# Patient Record
Sex: MALE | Race: WHITE | NOT HISPANIC OR LATINO | Employment: OTHER | URBAN - METROPOLITAN AREA
[De-identification: names, ages, dates, MRNs, and addresses within clinical notes are randomized per-mention and may not be internally consistent; named-entity substitution may affect disease eponyms.]

---

## 2017-01-18 ENCOUNTER — GENERIC CONVERSION - ENCOUNTER (OUTPATIENT)
Dept: OTHER | Facility: OTHER | Age: 82
End: 2017-01-18

## 2017-01-24 ENCOUNTER — GENERIC CONVERSION - ENCOUNTER (OUTPATIENT)
Dept: OTHER | Facility: OTHER | Age: 82
End: 2017-01-24

## 2017-02-06 ENCOUNTER — GENERIC CONVERSION - ENCOUNTER (OUTPATIENT)
Dept: OTHER | Facility: OTHER | Age: 82
End: 2017-02-06

## 2017-02-15 ENCOUNTER — GENERIC CONVERSION - ENCOUNTER (OUTPATIENT)
Dept: OTHER | Facility: OTHER | Age: 82
End: 2017-02-15

## 2017-02-22 ENCOUNTER — GENERIC CONVERSION - ENCOUNTER (OUTPATIENT)
Dept: OTHER | Facility: OTHER | Age: 82
End: 2017-02-22

## 2017-02-23 ENCOUNTER — GENERIC CONVERSION - ENCOUNTER (OUTPATIENT)
Dept: OTHER | Facility: OTHER | Age: 82
End: 2017-02-23

## 2017-02-24 ENCOUNTER — GENERIC CONVERSION - ENCOUNTER (OUTPATIENT)
Dept: OTHER | Facility: OTHER | Age: 82
End: 2017-02-24

## 2017-02-26 ENCOUNTER — GENERIC CONVERSION - ENCOUNTER (OUTPATIENT)
Dept: OTHER | Facility: OTHER | Age: 82
End: 2017-02-26

## 2018-01-12 NOTE — MISCELLANEOUS
Message   Recorded as Task   Date: 12/05/2016 11:30 AM, Created By: Daniele Fox   Task Name: Care Coordination   Assigned To: Soledad Wright   Regarding Patient: Shanon Zepeda, Status: In Progress   Comment:    Jorge Luis Carmona - 05 Dec 2016 11:30 AM     TASK CREATED  looks like needs felecia   discharged from [facility] - not sure of date   Soledad Wright - 06 Dec 2016 8:51 AM     TASK EDITED  PT confused-- will attempt to call daughter- Emir Gonzalez - 06 Dec 2016 8:59 AM     TASK EDITED  Msg left for Sparkle Pierre to [place]  - 06 Dec 2016 9:51 AM     TASK EDITED  Daughter says he had a cardioversion 12/2/16 but did not stay overnight  Active Problems    1  Aftercare following surgery of the musculoskeletal system (V58 78) (Z47 89)   2  Allergic rhinitis (477 9) (J30 9)   3  Arthritis (716 90) (M19 90)   4  Asthma (493 90) (J45 909)   5  Cellulitis of ankle (682 6) (L03 119)   6  Colitis (558 9) (K52 9)   7  Congestive heart failure (428 0) (I50 9)   8  Controlled diabetes mellitus type II without complication (018 30) (F98 0)   9  COPD with exacerbation (491 21) (J44 1)   10  Edema (782 3) (R60 9)   11  Esophageal reflux (530 81) (K21 9)   12  Heart disease (429 9) (I51 9)   13  Hyperlipidemia, mixed (272 2) (E78 2)   14  Hypertension (401 9) (I10)   15  Inguinal hernia (550 90) (K40 90)   16  Intertrochanteric fracture of left hip (820 21) (S72 142A)   17  Laceration of eyebrow (873 42) (S01 119A)   18  Localized primary carpometacarpal osteoarthritis (715 14) (M19 049)   19  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)   20  Magnesium deficiency (275 2) (E61 2)   21  Need for pneumococcal vaccination (V03 82) (Z23)   22  Osteoporosis (733 00) (M81 0)   23  Pain in joint of right shoulder region (719 41) (M25 511)   24  Pain in joint, ankle and foot, left (719 47) (M25 572)   25  Primary localized osteoarthritis of left knee (715 16) (M17 12)   26   Right Rotator Cuff Sprain (Capsule) (840 4)   27  Trigger index finger of left hand (727 03) (M65 322)   28  Trigger ring finger of left hand (727 03) (M65 342)   29  Trigger thumb of left hand (727 03) (M65 312)   30  Trigger thumb of right hand (727 03) (M65 311)   31  Wrist sprain (842 00) (S63 509A)    Current Meds   1  Albuterol Sulfate (2 5 MG/3ML) 0 083% Inhalation Nebulization Solution; INHALE 1   0 083% Twice daily; Therapy: 93Qkw9344 to Recorded   2  Amiodarone HCl - 200 MG Oral Tablet; TAKE 1 TABLET DAILY; Therapy: 90Hvd4889 to (Evaluate:47Igm0957) Recorded   3  Aspir-81 81 MG Oral Tablet Delayed Release; TAKE 1 TABLET DAILY; Therapy: 10NEM1016 to Recorded   4  Atrovent HFA 17 MCG/ACT Inhalation Aerosol Solution; INHALE 2 PUFFS TWICE DAILY; Therapy: 60Fvh0987 to Recorded   5  Cephalexin 500 MG Oral Capsule; TAKE 1 CAPSULE 3 TIMES DAILY UNTIL GONE;   Therapy: 67QSE8176 to (Evaluate:62Asu3492)  Requested for: 36LSV2585; Last   Rx:67Kjf8811 Ordered   6  Cephalexin 500 MG Oral Capsule; TAKE 1 CAPSULE 3 TIMES DAILY UNTIL GONE;   Therapy: 20GCB4247 to (Evaluate:13Oct2016)  Requested for: 60VJR1948; Last   Rx:03Oct2016 Ordered   7  Ferrous Sulfate 325 (65 Fe) MG Oral Tablet Delayed Release; TAKE 1 TABLET DAILY; Therapy: 84KWF1978 to Recorded   8  Fexofenadine HCl - 180 MG Oral Tablet; TAKE 1 TABLET DAILY AS NEEDED; Therapy: (Recorded:11Jan2016) to Recorded   9  Fluticasone Propionate 50 MCG/ACT Nasal Suspension; INHALE TWO (2) SPRAY(S)   INTO EACH NOSTRIL  DAILY AFTER BATHING; Therapy: 41Wsn7997 to (Evaluate:50Gpk1845)  Requested for: 84DMS4949; Last   Rx:30Mar2016 Ordered   10  Glimepiride 1 MG Oral Tablet; TAKE 1 TABLET DAILY; Therapy: 46OUU2576 to Recorded   11  Lasix 80 MG Oral Tablet (Furosemide); TAKE 1 TABLET TWICE DAILY; Therapy: (Rani Jeffries) to Recorded   12  Lotemax 0 5 % Ophthalmic Suspension; INSTILL 1 DROP Daily; Therapy: 74FSC7839 to Recorded   13   Maxitrol 3 5-70489-7 1 Ophthalmic Ointment (Neomycin-Polymyxin-Dexameth); APPLY    1/2 INCH RIBBON INTO AFFECTED EYE(S) AT    BEDTIME; Therapy: 10KSJ5768 to Recorded   14  MetOLazone 2 5 MG Oral Tablet; TAKE 1 TABLET DAILY AS DIRECTED; Therapy: 85Tfu6356 to Recorded   15  Metoprolol Succinate ER 25 MG Oral Tablet Extended Release 24 Hour; TAKE 1 TABLET    DAILY; Therapy: 66BTG2159 to (Evaluate:26Oct2016)  Requested for: 30Apr2016; Last    Rx:29Apr2016 Ordered   16  Montelukast Sodium 10 MG Oral Tablet; TAKE 1 TABLET DAILY AS DIRECTED; Therapy: 48MVH6189 to (Earl Booth)  Requested for: 67OQP2120; Last    Rx:02Nov2016; Status: ACTIVE - Retrospective By Protocol Authorization Ordered   17  Proventil  (90 Base) MCG/ACT Inhalation Aerosol Solution; INHALE 1 PUFF    EVERY 4 TO 6 HOURS; Therapy: 31EWY7214 to (Evaluate:01Mar2017)  Requested for: 02Sep2016; Last    Rx:06Nmz8340; Status: ACTIVE - Retrospective By Protocol Authorization Ordered   18  RaNITidine HCl - 150 MG Oral Tablet; TAKE 1 TABLET AT BEDTIME  Requested for:    30Apr2016; Last Rx:29Apr2016 Ordered   19  Restasis 0 05 % Ophthalmic Emulsion; INSTILL 1 DROP IN BOTH EYES EVERY 12    HOURS DAILY; Therapy: 29Apr2016 to Recorded   20  SulfaSALAzine 500 MG Oral Tablet Delayed Release; Take 1 tablet twice daily; Therapy: 11QOC5303 to (Evaluate:12Mar2016)  Requested for: 46Owr6636; Last    Rx:15Sep2015 Ordered   21  Systane 0 4-0 3 % Ophthalmic Solution; INSTILL 1 DROP IN AFFECTED EYE(S) EVERY    4-6 HOURS; Therapy: 90UCC7409 to Recorded   22  Vitamin D 1000 UNIT Oral Tablet; TAKE 1 TABLET DAILY; Therapy: 23FNX3784 to Recorded   23  Vytorin 10-20 MG Oral Tablet; TAKE 1 TABLET DAILY AS DIRECTED; Therapy: 60Xae8131 to (Evaluate:01Mar2017)  Requested for: 64Six0274; Last    Rx:18Prc7722; Status: ACTIVE - Retrospective By Protocol Authorization Ordered   24  Warfarin Sodium 5 MG Oral Tablet;     Therapy: 43XHZ5538 to (Last Rx:03Mar2011)  Requested for: 89AQZ1934 Ordered   25  Warfarin Sodium TABS; 1 1 mg tab po qd for 4 days    1 2 5 mg tab po qd for 4 days; Therapy: (Recorded:11Jan2016) to Recorded    Allergies    1  Iodine SOLN   2  Naprosyn TABS   3   Penicillins    Signatures   Electronically signed by : Neno Walsh, ; Dec  6 2016  9:51AM EST                       (Author)

## 2018-01-15 NOTE — MISCELLANEOUS
Assessment    1  Cellulitis of ankle (682 6) (L03 119)   2  Inguinal hernia (550 90) (K40 90)    Plan  Cellulitis of ankle    · Cephalexin 500 MG Oral Capsule; TAKE 1 CAPSULE 3 TIMES DAILY UNTIL GONE   Rx By: Xavi Avery; Dispense: 10 Days ; #:30 Capsule; Refill: 0; For: Cellulitis of ankle; BEST = N; Verified Transmission to [facility] [id number] [facility]; Last Updated By: System, [employer]; 10/3/2016 10:50:00 AM    Discussion/Summary  Discussion Summary:   Disc lasix use and parameters on lasix by wt/disc cellulitis and redressed wound  Counseling Documentation With Imm: total time of encounter was 30 minutes  Chief Complaint  Chief Complaint Free Text Note Form: FOLLOW-UP HOSPITAL--DISCUSS LASIX DOSAGE      History of Present Illness  TCM Communication [facility]: The patient is being contacted for follow-up after hospitalization  He was hospitalized at [facility]  The date of admission: 09/21/2016, date of discharge: 09/24/2016  Diagnosis: CHF  He was discharged to home  Medications reviewed and updated today  He scheduled a follow up appointment  Follow-up appointments with other specialists: CARDIOLOGIST  The patient is currently asymptomatic  Communication performed and completed by      Review of Systems  Complete-Male:   Constitutional: No fever or chills, feels well, no tiredness, no recent weight gain or weight loss  Cardiovascular: No complaints of slow heart rate, no fast heart rate, no chest pain, no palpitations, no leg claudication, no lower extremity  Respiratory: No complaints of shortness of breath, no wheezing, no cough, no SOB on exertion, no orthopnea or PND  Gastrointestinal: No complaints of abdominal pain, no constipation, no nausea or vomiting, no diarrhea or bloody stools  Active Problems    1  Aftercare following surgery of the musculoskeletal system (V58 78) (Z47 89)   2  Allergic rhinitis (477 9) (J30 9)   3  Arthritis (716 90) (M19 90)   4   Asthma (493 90) (J45 909)   5  Cellulitis of ankle (682 6) (L03 119)   6  Colitis (558 9) (K52 9)   7  Congestive heart failure (428 0) (I50 9)   8  Controlled diabetes mellitus type II without complication (025 82) (F49 2)   9  COPD with exacerbation (491 21) (J44 1)   10  Edema (782 3) (R60 9)   11  Esophageal reflux (530 81) (K21 9)   12  Heart disease (429 9) (I51 9)   13  Hyperlipidemia, mixed (272 2) (E78 2)   14  Hypertension (401 9) (I10)   15  Inguinal hernia (550 90) (K40 90)   16  Intertrochanteric fracture of left hip (820 21) (S72 142A)   17  Laceration of eyebrow (873 42) (S01 119A)   18  Localized primary carpometacarpal osteoarthritis (715 14) (M19 049)   19  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)   20  Magnesium deficiency (275 2) (E61 2)   21  Need for pneumococcal vaccination (V03 82) (Z23)   22  Osteoporosis (733 00) (M81 0)   23  Pain in joint of right shoulder region (719 41) (M25 511)   24  Pain in joint, ankle and foot, left (719 47) (M25 572)   25  Primary localized osteoarthritis of left knee (715 16) (M17 12)   26  Right Rotator Cuff Sprain (Capsule) (840 4)   27  Trigger index finger of left hand (727 03) (M65 322)   28  Trigger ring finger of left hand (727 03) (M65 342)   29  Trigger thumb of left hand (727 03) (M65 312)   30  Trigger thumb of right hand (727 03) (M65 311)   31  Wrist sprain (842 00) (S13 597N)    Past Medical History    1  History of cardiac pacemaker (V12 50,V45 01) (Z95 0)   2  History of macular degeneration (V12 49) (Z86 69)   3  History of polymyalgia rheumatica (V13 59) (Z87 39)   4  History of Trigger thumb of left hand (727 03) (M65 312)    Surgical History    1  History of Heart Valve Replacement   2  History of Pacemaker Placement    Family History  Mother    1  Maternal family history unobtainable (V49 89) (Z78 9)   2  Paternal family history unobtainable (V49 89) (Z78 9)  Father    3  Maternal family history unobtainable (V49 89) (Z78 9)   4  Paternal family history unobtainable (V49 89) (Z78 9)  Family History    5  Family history of Gastric Cancer (V16 0)    Social History    · Being A Social Drinker   · Daily Cola Consumption (5  Cans/Day)   · Marital History - Currently    · Never A Smoker    Current Meds   1  Albuterol Sulfate (2 5 MG/3ML) 0 083% Inhalation Nebulization Solution; INHALE 1   0 083% Twice daily; Therapy: 29Apr2016 to Recorded   2  Amiodarone HCl - 200 MG Oral Tablet; TAKE 1 TABLET DAILY; Therapy: 34Yro3205 to (Evaluate:48Qgt5302) Recorded   3  Aspir-81 81 MG Oral Tablet Delayed Release; TAKE 1 TABLET DAILY; Therapy: 48UNM2269 to Recorded   4  Atrovent HFA 17 MCG/ACT Inhalation Aerosol Solution; INHALE 2 PUFFS TWICE DAILY; Therapy: 09Bog4374 to Recorded   5  Cephalexin 500 MG Oral Capsule; TAKE 1 CAPSULE 3 TIMES DAILY UNTIL GONE;   Therapy: 99ATL0216 to (Evaluate:07Sfn8908)  Requested for: 77XGI5266; Last   Rx:01Jul2016 Ordered   6  Ferrous Sulfate 325 (65 Fe) MG Oral Tablet Delayed Release; TAKE 1 TABLET DAILY; Therapy: 87AMF3829 to Recorded   7  Fexofenadine HCl - 180 MG Oral Tablet; TAKE 1 TABLET DAILY AS NEEDED; Therapy: (Recorded:11Jan2016) to Recorded   8  Fluticasone Propionate 50 MCG/ACT Nasal Suspension; INHALE TWO (2) SPRAY(S)   INTO EACH NOSTRIL  DAILY AFTER BATHING; Therapy: 11Kfy5621 to (Evaluate:89Zqt6304)  Requested for: 98KSB4193; Last   Rx:30Mar2016 Ordered   9  Glimepiride 1 MG Oral Tablet; TAKE 1 TABLET DAILY; Therapy: 33GKB4068 to Recorded   10  Lasix 80 MG Oral Tablet; TAKE 1 TABLET TWICE DAILY; Therapy: (Donaldo Jeffrey) to Recorded   11  Lotemax 0 5 % Ophthalmic Suspension; INSTILL 1 DROP Daily; Therapy: 47TGO8822 to Recorded   12  Maxitrol 3 5-80553-0 1 Ophthalmic Ointment; APPLY 1/2 INCH RIBBON INTO AFFECTED    EYE(S) AT BEDTIME; Therapy: 33NLW6886 to Recorded   13  Metolazone 2 5 MG Oral Tablet; TAKE 1 TABLET DAILY AS DIRECTED; Therapy: 67Tic3559 to Recorded   14  Metoprolol Succinate ER 25 MG Oral Tablet Extended Release 24 Hour; TAKE 1 TABLET    DAILY; Therapy: 86FSN0795 to (Evaluate:26Oct2016)  Requested for: 30Apr2016; Last    Rx:29Apr2016 Ordered   15  Montelukast Sodium 10 MG Oral Tablet; TAKE 1 TABLET DAILY AS DIRECTED     Requested for: 02Sep2016; Last Rx:02Sep2016; Status: ACTIVE - Retrospective By    Protocol Authorization Ordered   16  Proventil  (90 Base) MCG/ACT Inhalation Aerosol Solution; INHALE 1 PUFF    EVERY 4 TO 6 HOURS; Therapy: 69UIF4283 to (Evaluate:01Mar2017)  Requested for: 02Sep2016; Last    Rx:02Sep2016; Status: ACTIVE - Retrospective By Protocol Authorization Ordered   17  Ranitidine HCl - 150 MG Oral Tablet; TAKE 1 TABLET AT BEDTIME  Requested for:    30Apr2016; Last Rx:29Apr2016 Ordered   18  Restasis 0 05 % Ophthalmic Emulsion; INSTILL 1 DROP IN BOTH EYES EVERY 12    HOURS DAILY; Therapy: 29Apr2016 to Recorded   19  SulfaSALAzine 500 MG Oral Tablet Delayed Release; Take 1 tablet twice daily; Therapy: 67XUL3043 to (Evaluate:12Mar2016)  Requested for: 96Beu6703; Last    Rx:15Sep2015 Ordered   20  Systane 0 4-0 3 % Ophthalmic Solution; INSTILL 1 DROP IN AFFECTED EYE(S) EVERY    4-6 HOURS; Therapy: 53FCS5694 to Recorded   21  Vitamin D 1000 UNIT Oral Tablet; TAKE 1 TABLET DAILY; Therapy: 24KBN1286 to Recorded   22  Vytorin 10-20 MG Oral Tablet; TAKE 1 TABLET DAILY AS DIRECTED; Therapy: 31Ssr1149 to (Evaluate:01Mar2017)  Requested for: 02Sep2016; Last    Rx:02Sep2016; Status: ACTIVE - Retrospective By Protocol Authorization Ordered   23  Warfarin Sodium 5 MG Oral Tablet; Therapy: 77BUY7220 to (Last Rx:03Mar2011)  Requested for: 48KWK3563 Ordered   24  Warfarin Sodium TABS; 1 1 mg tab po qd for 4 days    1 2 5 mg tab po qd for 4 days; Therapy: (Recorded:11Jan2016) to Recorded    Allergies    1  Iodine SOLN   2  Naprosyn TABS   3   Penicillins    Vitals  Signs   Recorded: 57LSE6414 33:30VZ   Systolic: 899  Diastolic: 56  Heart Rate: 78  Height: 5 ft 11 in  Weight: 172 lb   BMI Calculated: 23 99  BSA Calculated: 1 97    Physical Exam    Constitutional   General appearance: No acute distress, well appearing and well nourished  Pulmonary   Auscultation of lungs: Clear to auscultation, equal breath sounds bilaterally, no wheezes, no rales, no rhonci  Cardiovascular   Auscultation of heart: Normal rate and rhythm, normal S1 and S2, without murmurs  Skin   Examination of the skin for lesions: Abnormal   cellulitis R tibial area  Health Management  Controlled diabetes mellitus type II without complication   *VB - Eye Exam; every 1 year; Last 83Syd1849; Next Due: 74Ckw6532; Active  *VB - Foot Exam; every 1 year; Last 62YHO9360; Next Due: 71PDL5949;  Active    Signatures   Electronically signed by : Monica Patel MD; Oct  3 2016 10:56AM EST                       (Author)

## 2018-01-16 NOTE — MISCELLANEOUS
Assessment   1  Congestive heart failure (428 0) (I50 9)  2  COPD with exacerbation (491 21) (J44 1)  3  Hypertension (401 9) (I10)  4  Hyperlipidemia, mixed (272 2) (E78 2)  5  Edema (782 3) (R60 9)  6  Controlled diabetes mellitus type II without complication (168 79) (X69 0)    Discussion/Summary  Discussion Summary:   Continue same meds- Continue F/U with [facility]  Will continue to see as needed every 3-6 months  Observe RLQ pain, this may a hernia- Get the lungs and heart maximized before any surgical consult  Chief Complaint  Chief Complaint Free Text Note Form: MERLINE      History of Present Illness  TCM Communication [facility]: The patient is being contacted for follow-up after hospitalization and [facility]  Hospital records were reviewed  He was hospitalized at [facility]  The dates of hospitalization: 6/2/16-6/7/16  Diagnosis: CHF  He was discharged to home  Medications reviewed and updated today  He scheduled a follow up appointment  The patient is currently asymptomatic  Referrals Needed:  N/A  Counseling was provided to patient's caretaker  Communication performed and completed by MADONNA aKtz [facility] @[facility]   HPI: F/U from [facility] admit for CHF- See Dr Sophia Aleman- seen 6/14/16  1) CHF- per Cardiology  2) Diabetes- Dr Belinda Khan 6/8- stopped Januvia  3) COPD- pulmonary- Federico Londono      Review of Systems  Complete-Male:   Constitutional: no fever, not feeling poorly, no chills and not feeling tired  Cardiovascular: the heart rate was not slow, no chest pain, the heart rate was not fast and no palpitations  Respiratory: wheezing and shortness of breath during exertion, but no shortness of breath and no cough  Gastrointestinal: no abdominal pain, no nausea, no vomiting, no constipation and no diarrhea  Genitourinary: nocturia, but no dysuria and no urinary hesitancy  Psychiatric: no personality change, no depression and no emotional problems  Active Problems   1   Aftercare following surgery of the musculoskeletal system (V58 78) (Z47 89)  2  Allergic rhinitis (477 9) (J30 9)  3  Arthritis (716 90) (M19 90)  4  Asthma (493 90) (J45 909)  5  Colitis (558 9) (K52 9)  6  Congestive heart failure (428 0) (I50 9)  7  Controlled diabetes mellitus type II without complication (261 79) (T75 2)  8  COPD with exacerbation (491 21) (J44 1)  9  Edema (782 3) (R60 9)  10  Esophageal reflux (530 81) (K21 9)  11  Heart disease (429 9) (I51 9)  12  Hyperlipidemia, mixed (272 2) (E78 2)  13  Hypertension (401 9) (I10)  14  Intertrochanteric fracture of left hip (820 21) (S72 142A)  15  Laceration of eyebrow (873 42) (S01 119A)  16  Localized primary carpometacarpal osteoarthritis (715 14) (M19 049)  17  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)  18  Magnesium deficiency (275 2) (E61 2)  19  Need for pneumococcal vaccination (V03 82) (Z23)  20  Osteoporosis (733 00) (M81 0)  21  Pain in joint of right shoulder region (719 41) (M25 511)  22  Pain in joint, ankle and foot, left (719 47) (M25 572)  23  Primary localized osteoarthritis of left knee (715 16) (M17 12)  24  Right Rotator Cuff Sprain (Capsule) (840 4)  25  Trigger index finger of left hand (727 03) (M65 322)  26  Trigger ring finger of left hand (727 03) (M65 342)  27  Trigger thumb of left hand (727 03) (M65 312)  28  Trigger thumb of right hand (727 03) (M65 311)  29  Wrist sprain (842 00) (D54 214N)    Past Medical History   1  History of cardiac pacemaker (V12 50,V45 01) (Z95 0)  2  History of macular degeneration (V12 49) (Z86 69)  3  History of polymyalgia rheumatica (V13 59) (Z87 39)  4  History of Trigger thumb of left hand (727 03) (M65 312)    Surgical History   1  History of Heart Valve Replacement  2  History of Pacemaker Placement    Family History  Mother   1  Maternal family history unobtainable (V49 89) (Z78 9)  2  Paternal family history unobtainable (V49 89) (Z78 9)  Father   3   Maternal family history unobtainable (V49 89) (Z78 9)  4  Paternal family history unobtainable (V49 89) (Z78 9)  Family History   5  Family history of Gastric Cancer (V16 0)  Family History Reviewed: The family history was reviewed and updated today  Social History    · Being A Social Drinker   · Daily Cola Consumption (5  Cans/Day)   · Marital History - Currently    · Never A Smoker  Social History Reviewed: The social history was reviewed and updated today  Current Meds  1  Albuterol Sulfate (2 5 MG/3ML) 0 083% Inhalation Nebulization Solution; INHALE 1   0 083% Twice daily; Therapy: 31Rzj5740 to Recorded  2  Amiodarone HCl - 200 MG Oral Tablet; TAKE 1 TABLET DAILY; Therapy: 94Hse2962 to (Evaluate:64Nkv8724) Recorded  3  Aspir-81 81 MG Oral Tablet Delayed Release; TAKE 1 TABLET DAILY; Therapy: 41ZDZ9857 to Recorded  4  Ferrous Sulfate 325 (65 Fe) MG Oral Tablet Delayed Release; TAKE 1 TABLET DAILY; Therapy: 35SCB0656 to Recorded  5  Fexofenadine HCl - 180 MG Oral Tablet; TAKE 1 TABLET DAILY AS NEEDED; Therapy: (Recorded:11Jan2016) to Recorded  6  Fluticasone Propionate 50 MCG/ACT Nasal Suspension; INHALE TWO (2) SPRAY(S)   INTO EACH NOSTRIL  DAILY AFTER BATHING; Therapy: 87Sng3674 to (Evaluate:90Owk3265)  Requested for: 38ICT4718; Last   Rx:30Mar2016 Ordered  7  Glimepiride 1 MG Oral Tablet; TAKE 1 TABLET DAILY; Therapy: 49UDL8941 to Recorded  8  Lasix 40 MG Oral Tablet; TAKE 2 TABLETS IN THE MORNING AND 1 TABLET IN THE   EVENING; Therapy: (Recorded:15Jun2016) to Recorded  9  Lotemax 0 5 % Ophthalmic Suspension; INSTILL 1 DROP Daily; Therapy: 99GVL2777 to Recorded  10  Magnesium 400 MG Oral Capsule; Take 1 capsule twice daily; Therapy: 03HUU9361 to Recorded  11  Maxitrol 3 5-50727-2 1 Ophthalmic Ointment; APPLY 1/2 INCH RIBBON INTO AFFECTED    EYE(S) AT BEDTIME; Therapy: 30LWH6091 to Recorded  12  Metoprolol Succinate ER 25 MG Oral Tablet Extended Release 24 Hour; TAKE 1 TABLET    DAILY;     Therapy: 98UCM7023 to (Evaluate:26Oct2016)  Requested for: 30Apr2016; Last    Rx:29Apr2016 Ordered  13  Montelukast Sodium 10 MG Oral Tablet; TAKE 1 TABLET DAILY AS DIRECTED     Requested for: 30Apr2016; Last Rx:29Apr2016 Ordered  14  Proventil  (90 Base) MCG/ACT Inhalation Aerosol Solution; INHALE 1 PUFF    EVERY 4 TO 6 HOURS; Therapy: 12EDB3034 to (Evaluate:27Aug2016)  Requested for: 85BAB1470; Last    Rx:59Sao6848 Ordered  15  Ranitidine HCl - 150 MG Oral Tablet; TAKE 1 TABLET AT BEDTIME  Requested for:    30Apr2016; Last Rx:29Apr2016 Ordered  16  Restasis 0 05 % Ophthalmic Emulsion; INSTILL 1 DROP IN BOTH EYES EVERY 12    HOURS DAILY; Therapy: 29Apr2016 to Recorded  17  Stiolto Respimat 2 5-2 5 MCG/ACT Inhalation Aerosol Solution; INHALE 2 PUFFS Daily; Therapy: 29Apr2016 to Recorded  18  SulfaSALAzine 500 MG Oral Tablet Delayed Release; Take 1 tablet twice daily; Therapy: 09KMT5402 to (Evaluate:12Mar2016)  Requested for: 42Mwv1188; Last    Rx:15Sep2015 Ordered  19  Systane 0 4-0 3 % Ophthalmic Solution; INSTILL 1 DROP IN AFFECTED EYE(S) EVERY    4-6 HOURS; Therapy: 54RQT1138 to Recorded  20  Vitamin D 1000 UNIT Oral Tablet; TAKE 1 TABLET DAILY; Therapy: 77NDM0078 to Recorded  21  Vytorin 10-20 MG Oral Tablet; TAKE 1 TABLET DAILY AS DIRECTED; Therapy: 22Xce4820 to (Evaluate:03Sep2016)  Requested for: 67MDE5862; Last    Rx:07Mar2016 Ordered  22  Warfarin Sodium 5 MG Oral Tablet; Therapy: 86PLN1502 to (Last Rx:03Mar2011)  Requested for: 54UKQ2213 Ordered  23  Warfarin Sodium TABS; 1 1 mg tab po qd for 4 days    1 2 5 mg tab po qd for 4 days; Therapy: (Recorded:11Jan2016) to Recorded  Medication List Reviewed: The medication list was reviewed and updated today  Allergies   1  Iodine SOLN  2  Naprosyn TABS  3   Penicillins    Vitals  Signs [Data Includes: Current Encounter]   Recorded: [id number] 01:57PM   Heart Rate: 73  Respiration: 16  Systolic: 074  Diastolic: 58  Height: 5 ft 11 in  Weight: 195 lb   BMI Calculated: 27 2  BSA Calculated: 2 08  O2 Saturation: 97    Physical Exam    Constitutional   General appearance: No acute distress, well appearing and well nourished  Eyes   Conjunctiva and lids: No swelling, erythema, or discharge  Pupils and irises: Equal, round and reactive to light  Ears, Nose, Mouth, and Throat   Nasal mucosa, septum, and turbinates: Normal without edema or erythema  Oropharynx: Normal with no erythema, edema, exudate or lesions  Pulmonary   Respiratory effort: No increased work of breathing or signs of respiratory distress  Auscultation of lungs: Abnormal   Decreased breath sounds but clear  No rales were heard  Wheezes  Cardiovascular   Auscultation of heart: Abnormal   Grade 3/6 systolic murmur  Rate appears regular today  Examination of extremities for edema and/or varicosities: Abnormal   +1 edema  Carotid pulses: Normal     Abdomen   Abdomen: Non-tender, no masses  Liver and spleen: No hepatomegaly or splenomegaly  Lymphatic   Palpation of lymph nodes in neck: No lymphadenopathy  Musculoskeletal   Gait and station: Normal     Psychiatric   Orientation to person, place and time: Normal     Mood and affect: Normal          Health Management  Controlled diabetes mellitus type II without complication   *VB - Eye Exam; every 1 year; Last 89TYH4739; Next Due: 08IPR5857; Active  *VB-Foot Exam; every 1 year; Last 58GZJ2510; Next Due: 71ZGV6533;  Active    Message   Recorded as Task   Date: 06/07/2016 01:35 PM, Created By: Eduardo Powers   Task Name: Hospital Call   Assigned To: Eduardo Powers   Regarding Patient: Fareed Romero, Status: Active   CommentHumphreyumm Norma - 07 Jun 2016 1:35 PM    TASK CREATED  Caller: Darline Luu, Consulting Specialist; Hospital Call  Needs [facility] for CHF     Signatures   Electronically signed by : Kaila Gonzales MD; Lasha 15 2016  6:25PM EST                       (Author)    Electronically signed by : Kaila Gonzales MD; Lasha 15 2016  6:26PM EST                       (Author)

## 2018-01-17 NOTE — PROGRESS NOTES
Assessment    1  Controlled diabetes mellitus type II without complication (178 14) (S93 4)   2  Congestive heart failure (428 0) (I50 9)   3  COPD with exacerbation (491 21) (J44 1)    Discussion/Summary  Impression: Initial Annual Wellness Visit  Cardiovascular screening and counseling: screening is current  Diabetes screening and counseling: screening is current  Colorectal cancer screening and counseling: screening not indicated  Prostate cancer screening and counseling: screening is current  Osteoporosis screening and counseling: screening is current  Abdominal aortic aneurysm screening and counseling: screening is current  Glaucoma screening and counseling: screening is current  Immunizations: influenza vaccine is up to date this year and the lifetime pneumococcal vaccine has been completed  Advance Directive Planning: complete and up to date  Patient Discussion: plan discussed with the patient  Chief Complaint   Wellness      History of Present Illness  The patient is being seen for the initial annual wellness visit  Medicare Screening and Risk Factors   Hospitalizations: no previous hospitalizations  Medicare Screening Tests Risk Questions   Abdominal aortic aneurysm risk assessment: none indicated  Osteoporosis risk assessment:  and over 48years of age  Drug and Alcohol Use: The patient quit smoking 1980  Diet and Physical Activity: Current diet includes 2 servings of fruit per day, 2 servings of vegetables per day, 2 servings of meat per day, 2 servings of whole grains per day, 2 servings of simple carbohydrates per day, 1 servings of dairy products per day, 0 cups of coffee per day, 0 cups of tea per day, 0 cans of regular soda per day and 1 cans of diet soda per day  He exercises 3 times per week  Exercise: walking 40 minutes per day  Mood Disorder and Cognitive Impairment Screening: He denies feeling down, depressed, or hopeless over the past two weeks   He denies feeling little interest or pleasure in doing things over the past two weeks  Functional Ability/Level of Safety: Hearing is normal bilaterally, normal in the right ear, normal in the left ear and a hearing aid is not used  The patient is currently able to do activities of daily living with limitations, able to do instrumental activities of daily living with limitations, able to participate in social activities with limitations and unable to drive  Activities of daily living details: transportation help needed, needs help shopping, meal preparation help needed, needs help doing housework, needs help doing laundry and needs help managing medications  Fall risk factors:  polypharmacy, mobility impairment, visual impairment and up and go test was unsteady or greater than thirty seconds, but The patient fell 2 times in the past 12 months  The most recent fall occurred indoors  The fall resulted from tripping and loss of balance  Advance Directives: Advance directives: living will, durable power of  for health care directives and advance directives  Co-Managers and Medical Equipment/Suppliers: See Patient Care Team      Patient Care Team    Care Team Member Role Specialty Office Number   Sue ROBLEDO  Orthopedic Surgery [phone]   Suresh Carcamo MD  Rheumatology [phone]   Shilpi Knox MD  Cardiology [phone]   [place] ROBLEDO  Orthopedic Surgery [phone]     Active Problems    1  Aftercare following surgery of the musculoskeletal system (V58 78) (Z47 89)   2  Allergic rhinitis (477 9) (J30 9)   3  Arthritis (716 90) (M19 90)   4  Asthma (493 90) (J45 909)   5  Colitis (558 9) (K52 9)   6  Controlled diabetes mellitus type II without complication (379 42) (D23 3)   7  Edema (782 3) (R60 9)   8  Esophageal reflux (530 81) (K21 9)   9  Heart disease (429 9) (I51 9)   10  Hyperlipidemia, mixed (272 2) (E78 2)   11  Hypertension (401 9) (I10)   12  Intertrochanteric fracture of left hip (820 21) (S72 142A)   13  Localized primary carpometacarpal osteoarthritis (715 14) (M19 049)   14  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)   15  Magnesium deficiency (275 2) (E61 2)   16  Pain in joint of right shoulder region (719 41) (M25 511)   17  Pain in joint, ankle and foot, left (719 47) (M25 572)   18  Primary localized osteoarthritis of left knee (715 16) (M17 12)   19  Right Rotator Cuff Sprain (Capsule) (840 4)   20  Trigger index finger of left hand (727 03) (M65 322)   21  Trigger ring finger of left hand (727 03) (M65 342)   22  Trigger thumb of left hand (727 03) (M65 312)   23  Trigger thumb of right hand (727 03) (M65 311)    Past Medical History    1  History of cardiac pacemaker (V12 50,V45 01) (Z95 0)   2  History of macular degeneration (V12 49) (Z86 69)   3  History of polymyalgia rheumatica (V13 59) (Z87 39)   4  History of Trigger thumb of left hand (727 03) (M65 312)    Surgical History    1  History of Heart Valve Replacement   2  History of Pacemaker Placement    Family History    1  Maternal family history unobtainable (V49 89) (Z78 9)   2  Paternal family history unobtainable (V49 89) (Z78 9)    3  Maternal family history unobtainable (V49 89) (Z78 9)   4  Paternal family history unobtainable (V49 89) (Z78 9)    5  Family history of Gastric Cancer (V16 0)    Social History    · Being A Social Drinker   · Daily Cola Consumption (5  Cans/Day)   · Marital History - Currently    · Never A Smoker    Current Meds   1  Aldactone 25 MG Oral Tablet; take 0 5 tablet daily; Therapy: 60QLB9154 to (Evaluate:11Mar2016) Recorded   2  Anoro Ellipta 62 5-25 MCG/INH Inhalation Aerosol Powder Breath Activated; INHALE 1   PUFFS Daily; Therapy: 08UPZ3358 to Recorded   3  Aspir-81 81 MG Oral Tablet Delayed Release; TAKE 1 TABLET DAILY; Therapy: 29ILT2961 to Recorded   4  Citracal Maximum TABS Recorded   5   Fexofenadine HCl - 180 MG Oral Tablet; TAKE 1 TABLET DAILY AS NEEDED; Therapy: (Recorded:11Jan2016) to Recorded   6  Fluticasone Propionate 50 MCG/ACT Nasal Suspension; Therapy: 14Kic3695 to (Evaluate:28Dec2014) Recorded   7  Glimepiride 4 MG Oral Tablet; TAKE 1 TABLET DAILY; Therapy: 49DTO8383 to Recorded   8  Lasix 40 MG Oral Tablet; TAKE 1 TABLET DAILY; Therapy: (Recorded:11Jan2016) to Recorded   9  Magnesium 400 MG Oral Capsule; Take 1 capsule twice daily; Therapy: 33THC2285 to Recorded   10  MetFORMIN HCl - 500 MG Oral Tablet; TAKE 1 TABLET TWICE DAILY WITH FOOD; Therapy: (Recorded:11Jan2016) to Recorded   11  Metoprolol Succinate ER 25 MG Oral Tablet Extended Release 24 Hour; Therapy: 92SED0973 to (Evaluate:30Aug2015) Recorded   12  Montelukast Sodium 10 MG Oral Tablet; Take 1 tablet daily; Therapy: (Recorded:11Jan2016) to Recorded   13  Proventil  (90 Base) MCG/ACT Inhalation Aerosol Solution; INHALE 1 PUFF    EVERY 4 TO 6 HOURS; Therapy: 63QIS2655 to (Evaluate:04Jun2016)  Requested for: 64VNG7515; Last    Rx:96Dnu9132 Ordered   14  SulfaSALAzine 500 MG Oral Tablet Delayed Release; Take 1 tablet twice daily; Therapy: 26GOR1810 to (Evaluate:12Mar2016)  Requested for: 59Zwl2544; Last    Rx:09Pmt0004 Ordered   15  Vytorin 10-20 MG Oral Tablet; TAKE 1 TABLET DAILY AS DIRECTED; Therapy: 40Qom0497 to (Evaluate:12Mar2016)  Requested for: 80Vlw2828; Last    Rx:88Lmc8783 Ordered   16  Warfarin Sodium 5 MG Oral Tablet; Therapy: 45HMC3060 to (Last Rx:03Mar2011)  Requested for: 69BEV1963 Ordered   17  Warfarin Sodium TABS; 1 1 mg tab po qd for 4 days    1 2 5 mg tab po qd for 4 days; Therapy: (Recorded:11Jan2016) to Recorded   18  Zantac 150 Maximum Strength TABS; TAKE 1 TABLET DAILY AS NEEDED; Therapy: (Recorded:11Jan2016) to Recorded    Allergies    1  Iodine SOLN   2  Naprosyn TABS   3   Penicillins    Immunizations  Influenza --- Cassy Rigo: 19JZW0268Ushospp Buff: 83HWC6410   Pneumococcal --- Cassy Sierra: 04SGZ5578     Results/Data  Encounter Results   Falls Risk Assessment (Dx V80 09 Screen for Neurologic Disorder) 23JKB6485 12:37PM User, Ahs     Test Name Result Flag Reference   Falls Risk      No falls in the past year     PHQ-2 Adult Depression Screening 72QOJ7910 12:37PM User, s     Test Name Result Flag Reference   PHQ-2 Adult Depression Score 0     Q1: 0, Q2: 0   PHQ-2 Adult Depression Screening Negative         Signatures   Electronically signed by : Justin Campos MD; Jan 11 2016  1:49PM EST                       (Author)